# Patient Record
(demographics unavailable — no encounter records)

---

## 2025-06-09 NOTE — HISTORY OF PRESENT ILLNESS
[FreeTextEntry1] : Patient 51-year-old  2 para 2 last menstrual period May 13, 2025 Presents for annual,, complaining of some left-sided sharp pain intermittent over the past 2 months Patient has history of PCOS

## 2025-06-09 NOTE — PLAN
[FreeTextEntry1] : Patient 51-year-old  2 para 2 last menstrual period May 13, 2025 Presents for annual discomfort, complaining of some left lower quadrant discomfort sharp pain over the past 2 months,, intermittent Physical exam reveals a well-developed well-nourished female no apparent distress,, BMI 33 Heart regular rhythm and rate, lungs clear, breast no mass nontender no skin change no nipple discharge no adenopathy, abdomen soft nontender no organomegaly Pelvic exam shows normal female external genitalia, vagina no lesions, cervix appropriate size nontender, uterus anteverted normal size nontender, adnexa no mass nontender Pap was performed Patient be provided prescription for breast mammogram Patient states she had negative colonoscopy approximate 3 months prior Patient to return for pelvic sonogram after her next cycle to further evaluate the left lower quadrant Questions answered Patient states she understands  Marybel was present as a chaperone for the entire assessment and examination of this patient

## 2025-06-09 NOTE — PHYSICAL EXAM
[MA] : MA [FreeTextEntry2] : Marybel [Appropriately responsive] : appropriately responsive [Alert] : alert [No Acute Distress] : no acute distress [No Lymphadenopathy] : no lymphadenopathy [Regular Rate Rhythm] : regular rate rhythm [No Murmurs] : no murmurs [Clear to Auscultation B/L] : clear to auscultation bilaterally [Soft] : soft [Non-distended] : non-distended [Non-tender] : non-tender [No Lesions] : no lesions [No HSM] : No HSM [No Mass] : no mass [FreeTextEntry6] : No masses, nontender, no skin changes, no nipple discharge, no adenopathy. [Oriented x3] : oriented x3 [Examination Of The Breasts] : a normal appearance [No Masses] : no breast masses were palpable [Labia Majora] : normal [Labia Minora] : normal [Normal] : normal [Tenderness] : nontender [Anteversion] : anteverted [Mass ___ cm] : no uterine mass was palpated [Uterine Adnexae] : normal

## 2025-06-17 NOTE — PLAN
[FreeTextEntry1] : She is 51-year-old  2 para 2 last menstrual period Lily 10, 2025 Patient presents for follow-up pelvic sonogram after previous visit patient complained of intermittent pelvic pain Pelvic sonogram Uterus 8.22 x 7.14 x 9.49 Cervical length 3.46 cm Right lateral decaliter fibroid measuring 3.77 x 3.49 x 3.5 by Right lateral anterior intramural fibroid measuring 1.66 x 1.83 x 1.50 Posterior submucosal fibroid measuring 1.52 x 1.67 x 1.42 Endometrial thickness 7.4 mm Right ovary 2.43 x 2.21 x 1.63 with a simple cyst measuring 1.16 x 0.97 x 0.87 with normal color Doppler flow Left ovary not seen No adnexal masses No free fluid Results discussed with patient Reassured Follow-up 1 year or prior to that as needed  Patient was seen in the office consultation with discussion of the results and not examined during this visit

## 2025-06-17 NOTE — HISTORY OF PRESENT ILLNESS
[FreeTextEntry1] : Patient 51-year-old  2 para 2 last menstrual period Lily 10, 2025 Patient presents for follow-up pelvic sonogram with history of intermittent pelvic pain